# Patient Record
Sex: MALE | Race: BLACK OR AFRICAN AMERICAN
[De-identification: names, ages, dates, MRNs, and addresses within clinical notes are randomized per-mention and may not be internally consistent; named-entity substitution may affect disease eponyms.]

---

## 2018-12-31 ENCOUNTER — HOSPITAL ENCOUNTER (EMERGENCY)
Dept: HOSPITAL 72 - EMR | Age: 38
Discharge: HOME | End: 2018-12-31
Payer: MEDICARE

## 2018-12-31 VITALS — WEIGHT: 165 LBS | BODY MASS INDEX: 24.44 KG/M2 | HEIGHT: 69 IN

## 2018-12-31 VITALS — SYSTOLIC BLOOD PRESSURE: 117 MMHG | DIASTOLIC BLOOD PRESSURE: 84 MMHG

## 2018-12-31 VITALS — SYSTOLIC BLOOD PRESSURE: 124 MMHG | DIASTOLIC BLOOD PRESSURE: 84 MMHG

## 2018-12-31 DIAGNOSIS — F15.10: ICD-10-CM

## 2018-12-31 DIAGNOSIS — F31.9: ICD-10-CM

## 2018-12-31 DIAGNOSIS — F25.9: ICD-10-CM

## 2018-12-31 DIAGNOSIS — F19.151: Primary | ICD-10-CM

## 2018-12-31 PROCEDURE — 99283 EMERGENCY DEPT VISIT LOW MDM: CPT

## 2018-12-31 NOTE — NUR
ED Nurse Note:

Patient cleared for discharge by Dr. Neff. Patient is ambulatory with steady 
gait, no s/s of acute distress and  ID band removed. Patient verbalized 
understanding of discharge instructions.

## 2018-12-31 NOTE — EMERGENCY ROOM REPORT
History of Present Illness


General


Chief Complaint:  Altered Level of Consciousness


Source:  Patient





Present Illness


HPI


Is a 38-year-old male with history of bipolar and methamphetamine abuse.  He 

presents with chief complaint of substance abuse and seen things.  He said that 

he spoke a lot of methamphetamine tonight.  He said he began to see things.  He 

was concerned as when called 911.  He said he felt better now.  He said that 

his new year resolution is to stop drugs so he used a lot tonight because he 

will be using it every again.  Denies suicidal thoughts homicidal thought.  

Denies any nausea vomiting.  Denies any other complaint.


Allergies:  


Coded Allergies:  


     No Known Allergies (Unverified , 12/31/18)





Patient History


Past Medical History:  see triage record, old chart reviewed, psych hx


Past Surgical History:  other


Pertinent Family History:  none


Social History:  Reports: smoking, drug use


Immunizations:  other


Reviewed Nursing Documentation:  PMH: Agreed; PSxH: Agreed





Nursing Documentation-PMH


Past Medical History:  No History, Except For


History Of Psychiatric Problem:  Yes - Bipolar; PTSD; Schizoaffective





Review of Systems


Eye:  Denies: eye pain, blurred vision


ENT:  Denies: ear pain, nose congestion, throat swelling


Respiratory:  Denies: cough, shortness of breath


Cardiovascular:  Denies: chest pain, palpitations


Gastrointestinal:  Denies: abdominal pain, diarrhea, nausea, vomiting


Musculoskeletal:  Denies: back pain, joint pain


Skin:  Denies: rash


Neurological:  Denies: headache, numbness


Endocrine:  Denies: increased thirst, increased urine


Hematologic/Lymphatic:  Denies: easy bruising


All Other Systems:  negative except mentioned in HPI





Physical Exam





Vital Signs








  Date Time  Temp Pulse Resp B/P (MAP) Pulse Ox O2 Delivery O2 Flow Rate FiO2


 


12/31/18 04:54 98.4 135 20 117/84 100 Room Air  





vitals with tachycardia


Sp02 EP Interpretation:  reviewed, normal


General Appearance:  well appearing, no apparent distress, alert


Head:  normocephalic, atraumatic


Eyes:  bilateral eye PERRL, bilateral eye EOMI


ENT:  hearing grossly normal, normal pharynx


Neck:  full range of motion, supple, no meningismus


Respiratory:  chest non-tender, lungs clear, normal breath sounds


Cardiovascular #1:  regular rate, rhythm, no murmur


Gastrointestinal:  normal bowel sounds, non tender, no mass, no organomegaly, 

no bruit, non-distended


Musculoskeletal:  back normal, gait/station normal, normal range of motion


Psychiatric:  mood/affect normal


Skin:  warm/dry





Medical Decision Making


Diagnostic Impression:  


 Primary Impression:  


 Drug-induced psychotic disorder


 Qualified Codes:  F19.951 - Other psychoactive substance use, unspecified with 

psychoactive substance-induced psychotic disorder with hallucinations


 Additional Impression:  


 Methamphetamine abuse


ER Course


Recent with drug induced psychosis.  He is better now.  Heart rate in the 110s.

  No evidence of suicidal thoughts or homicidal thought.  No criteria for 5150.

  Patient said he used more than usual because it'll be the last time he used.





This patient is a chronic risk of self injury due to poor impulse control, 

limited coping skills, and judgment intermittently impaired by intoxication.  I 

believe that the available clinical evidence to suggest that these 

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 

stable over time.  Hospitalization would likely attenuate risk of self-harm 

only during [facility] period, without lasting risk reduction.  Serious self-harm

, while possible, would likely be inadvertent, and because of impulsivity, and 

foreseeable.  For these reasons, I do not believe hospitalization would provide 

meaningful reduction in risk of self-harm.





Last Vital Signs








  Date Time  Temp Pulse Resp B/P (MAP) Pulse Ox O2 Delivery O2 Flow Rate FiO2


 


12/31/18 05:14  135 20   Room Air  


 


12/31/18 05:14 98.4   117/84 100   








Status:  improved


Disposition:  HOME, SELF-CARE


Condition:  Stable


Referrals:  


[facility] (PCP)





Additional Instructions:  


Stop using drugs.  Follow-up with your doctor at [place] within a week.  Asked 

for a referral for Addiction Medicine appointment.  Return if symptom worsen.











Mamadou Neff MD Dec 31, 2018 05:25

## 2020-02-25 ENCOUNTER — HOSPITAL ENCOUNTER (EMERGENCY)
Dept: HOSPITAL 72 - EMR | Age: 40
Discharge: HOME | End: 2020-02-25
Payer: MEDICARE

## 2020-02-25 VITALS — WEIGHT: 150 LBS | BODY MASS INDEX: 21 KG/M2 | HEIGHT: 71 IN

## 2020-02-25 VITALS — DIASTOLIC BLOOD PRESSURE: 82 MMHG | SYSTOLIC BLOOD PRESSURE: 134 MMHG

## 2020-02-25 VITALS — SYSTOLIC BLOOD PRESSURE: 138 MMHG | DIASTOLIC BLOOD PRESSURE: 83 MMHG

## 2020-02-25 DIAGNOSIS — E87.6: Primary | ICD-10-CM

## 2020-02-25 LAB
ADD MANUAL DIFF: NO
ALBUMIN SERPL-MCNC: 3.7 G/DL (ref 3.4–5)
ALBUMIN/GLOB SERPL: 1 {RATIO} (ref 1–2.7)
ALP SERPL-CCNC: 77 U/L (ref 46–116)
ALT SERPL-CCNC: 62 U/L (ref 12–78)
ANION GAP SERPL CALC-SCNC: 11 MMOL/L (ref 5–15)
AST SERPL-CCNC: 29 U/L (ref 15–37)
BASOPHILS NFR BLD AUTO: 1 % (ref 0–2)
BILIRUB SERPL-MCNC: 0.5 MG/DL (ref 0.2–1)
BUN SERPL-MCNC: 7 MG/DL (ref 7–18)
CALCIUM SERPL-MCNC: 9 MG/DL (ref 8.5–10.1)
CHLORIDE SERPL-SCNC: 97 MMOL/L (ref 98–107)
CO2 SERPL-SCNC: 28 MMOL/L (ref 21–32)
CREAT SERPL-MCNC: 1 MG/DL (ref 0.55–1.3)
EOSINOPHIL NFR BLD AUTO: 0.8 % (ref 0–3)
ERYTHROCYTE [DISTWIDTH] IN BLOOD BY AUTOMATED COUNT: 11 % (ref 11.6–14.8)
GLOBULIN SER-MCNC: 3.8 G/DL
HCT VFR BLD CALC: 49.4 % (ref 42–52)
HGB BLD-MCNC: 17.1 G/DL (ref 14.2–18)
LYMPHOCYTES NFR BLD AUTO: 36.6 % (ref 20–45)
MCV RBC AUTO: 93 FL (ref 80–99)
MONOCYTES NFR BLD AUTO: 5.9 % (ref 1–10)
NEUTROPHILS NFR BLD AUTO: 55.8 % (ref 45–75)
PLATELET # BLD: 260 K/UL (ref 150–450)
POTASSIUM SERPL-SCNC: 2.9 MMOL/L (ref 3.5–5.1)
RBC # BLD AUTO: 5.29 M/UL (ref 4.7–6.1)
SODIUM SERPL-SCNC: 136 MMOL/L (ref 136–145)
WBC # BLD AUTO: 5.7 K/UL (ref 4.8–10.8)

## 2020-02-25 PROCEDURE — 99284 EMERGENCY DEPT VISIT MOD MDM: CPT

## 2020-02-25 PROCEDURE — 80053 COMPREHEN METABOLIC PANEL: CPT

## 2020-02-25 PROCEDURE — 36415 COLL VENOUS BLD VENIPUNCTURE: CPT

## 2020-02-25 PROCEDURE — 85025 COMPLETE CBC W/AUTO DIFF WBC: CPT

## 2020-02-25 PROCEDURE — 93005 ELECTROCARDIOGRAM TRACING: CPT

## 2020-02-25 NOTE — NUR
ED Nurse Note:



Patient brought in by ambulance d/t hypoglycemia. Per EMS, on scene glucose 
reading was 58. Patient aao x 4 and ambulatory. Patient has history of bipolar 
disorder and does not take medications for it, patient also reports using meth 
earlier tonight. Patient placed in gown and cardiac monitor. Patient exhibiting 
signs of confusion and paranoia. No acute distress noted.

## 2020-02-25 NOTE — NUR
ER DISCHARGE NOTE:



Patient is cleared to be discharged per ERMD, pt is aox4, on room air, with 
stable vital signs. pt was given dc instructions, pt was able to verbalize 
understanding, pt id band and iv site removed intact without complications. pt 
is able to ambulate with steady gait. pt took all belongings. pt stable upon 
discharge.

## 2020-02-26 NOTE — EMERGENCY ROOM REPORT
History of Present Illness


General


Chief Complaint:  Abnormal Labs


Source:  Patient, EMS





Present Illness


HPI


Patient is a 39-year-old male brought in by EMS after increased generalized 

weakness.  He reports having a recent alcohol intake.  He states that he had 

some beer which caused him to feel somewhat weak.  Denies any fever.  He had 

not been vomiting.  He reports having not taking any medications currently.  

Denies any vomiting or diarrhea.  Per EMS patient had been using 

methamphetamine.  Denies any current auditory visual hallucinations.


Allergies:  


Coded Allergies:  


     No Known Allergies (Unverified , 12/31/18)





Patient History


Past Medical History:  see triage record


Reviewed Nursing Documentation:  PMH: Agreed; PSxH: Agreed





Review of Systems


All Other Systems:  negative except mentioned in HPI





Physical Exam





Vital Signs








  Date Time  Temp Pulse Resp B/P (MAP) Pulse Ox O2 Delivery O2 Flow Rate FiO2


 


2/25/20 21:34 98.6 120 18 143/84 (103) 98 Room Air  








Sp02 EP Interpretation:  reviewed, normal


General Appearance:  normal inspection, well appearing, no apparent distress, 

alert, GCS 15, non-toxic


Head:  atraumatic


ENT:  normal ENT inspection, hearing grossly normal, normal voice


Neck:  normal inspection, full range of motion, supple, no bony tend


Respiratory:  normal inspection, lungs clear, normal breath sounds, no 

respiratory distress, no retraction, no wheezing


Cardiovascular #1:  regular rate, rhythm, no edema


Gastrointestinal:  normal inspection, normal bowel sounds, non tender, soft, no 

guarding, no hernia


Genitourinary:  no CVA tenderness


Musculoskeletal:  normal inspection, back normal, normal range of motion


Neurologic:  alert, motor strength/tone normal, CNs III-XII nml as tested, 

oriented x3, responsive, speech normal, normal inspection


Psychiatric:  normal inspection, judgement/insight normal, mood/affect normal





Medical Decision Making


Diagnostic Impression:  


 Primary Impression:  


 Hypokalemia


ER Course


Patient presented for increased generalized weakness.  Differential diagnosis 

include was not limited to electrolyte abnormality, anemia, hypoglycemic 

episode among others.  Because of complexity of patient's case laboratory tests

  were ordered.  Patient was given oral potassium due to hypokalemia.  He had 

been noted to have adequate blood sugar.  Patient stated he felt better and 

wanted to leave.  Patient appears to be has capable of self-care.  The patient 

is advised to follow up with  primary care doctor in 1-2 days.  Patient is 

advised to return if any worsening condition or if any changes in status that 

are concerning.





This report is dictated with Dragon transcription software which may 

occasionally lead to discrepancies related to use of this software.





Last Vital Signs








  Date Time  Temp Pulse Resp B/P (MAP) Pulse Ox O2 Delivery O2 Flow Rate FiO2


 


2/25/20 23:34 98.4 108 18 134/82 99 Room Air  








Status:  improved


Disposition:  HOME, SELF-CARE


Condition:  Stable


Referrals:  


[facility] (PCP)


Patient Instructions:  Hypokalemia











Ethan Catherine MD Feb 26, 2020 00:10